# Patient Record
Sex: MALE | Race: WHITE | Employment: OTHER | URBAN - METROPOLITAN AREA
[De-identification: names, ages, dates, MRNs, and addresses within clinical notes are randomized per-mention and may not be internally consistent; named-entity substitution may affect disease eponyms.]

---

## 2017-01-06 PROBLEM — I35.0 NONRHEUMATIC AORTIC VALVE STENOSIS: Status: ACTIVE | Noted: 2017-01-06

## 2017-02-11 ENCOUNTER — HOSPITAL ENCOUNTER (INPATIENT)
Age: 82
LOS: 1 days | Discharge: HOME | DRG: 312 | End: 2017-02-13
Attending: EMERGENCY MEDICINE | Admitting: INTERNAL MEDICINE
Payer: MEDICARE

## 2017-02-11 DIAGNOSIS — N17.9 ACUTE RENAL FAILURE, UNSPECIFIED ACUTE RENAL FAILURE TYPE (HCC): ICD-10-CM

## 2017-02-11 DIAGNOSIS — I35.0 AORTIC VALVE STENOSIS, UNSPECIFIED ETIOLOGY: ICD-10-CM

## 2017-02-11 DIAGNOSIS — R55 SYNCOPE AND COLLAPSE: Primary | ICD-10-CM

## 2017-02-11 PROCEDURE — 99285 EMERGENCY DEPT VISIT HI MDM: CPT | Performed by: EMERGENCY MEDICINE

## 2017-02-11 NOTE — IP AVS SNAPSHOT
Current Discharge Medication List  
  
Take these medications at their scheduled times Dose & Instructions Dispensing Information Comments Morning Noon Evening Bedtime  
 finasteride 5 mg tablet Commonly known as:  PROSCAR Your next dose is: Today, Tomorrow Other:  ____________ Dose:  5 mg Take 5 mg by mouth daily. Refills:  0  
     
   
   
   
  
 simvastatin 20 mg tablet Commonly known as:  ZOCOR Your next dose is: Today, Tomorrow Other:  ____________ Dose:  20 mg Take 1 Tab by mouth nightly. Quantity:  90 Tab Refills:  1  
     
   
   
   
  
 sodium bicarbonate 650 mg tablet Your next dose is: Today, Tomorrow Other:  ____________ Dose:  650 mg Take 650 mg by mouth two (2) times a day. 6 tabs per day Refills:  0  
     
   
   
   
  
 terazosin 10 mg capsule Commonly known as:  HYTRIN Your next dose is: Today, Tomorrow Other:  ____________ Dose:  10 mg Take 1 Cap by mouth nightly. Quantity:  90 Cap Refills:  4 Take these medications as needed Dose & Instructions Dispensing Information Comments Morning Noon Evening Bedtime  
 hydrocortisone 25 mg Supp Commonly known as:  ANUSOL-HC Your next dose is: Today, Tomorrow Other:  ____________ Dose:  25 mg Insert 1 Suppository into rectum two (2) times daily as needed for Hemorrhoids. Quantity:  12 Suppository Refills:  2 Take these medications as directed Dose & Instructions Dispensing Information Comments Morning Noon Evening Bedtime  
 calcitRIOL 0.25 mcg capsule Commonly known as:  ROCALTROL Your next dose is: Today, Tomorrow Other:  ____________   Refills:  11

## 2017-02-11 NOTE — IP AVS SNAPSHOT
[address] 
[phone] Patient: Cornelio Miller. Genevieve Kiran MRN: [id number] [facility]:[birth year] You are allergic to the following Allergen Reactions Other Medication Unknown (comments) Lexi Carias Recent Documentation Height Weight BMI Smoking Status 1.778 m 95.7 kg 30.28 kg/m2 Former Smoker Emergency Contacts Name Discharge Info Relation Home Work Mobile Irasema Lombardo  Spouse [3] [phone] About your hospitalization You were admitted on:  February 12, 2017 You last received care in the:  [facility] 3 ICU You were discharged on:  February 13, 2017 Unit phone number:  [phone] Why you were hospitalized Your primary diagnosis was:  Syncope Your diagnoses also included:  Anemia In Chronic Kidney Disease, Chronic Kidney Disease, Stage Iv (Severe) (Hcc), Nonrheumatic Aortic Valve Stenosis, George (Acute Kidney Injury) (Hcc), Thrombocytopenia (Hcc), Orthostatic Hypotension Providers Seen During Your Hospitalizations Provider Role Specialty Primary office phone Ben Rivera MD Attending Provider Emergency Medicine [phone] Scott Garcia DO Attending Provider Internal Medicine [phone] Mick Amado MD Attending Provider Internal Medicine [phone] Your Primary Care Physician (PCP) Primary Care Physician Office Phone Office Fax Hector Schrader [phone] Follow-up Information Follow up With Details Comments [address] Cardiology Schedule an appointment as soon as possible for a visit in 3 days to establish care for severe Aortic Stenosis in patient with multiple syncopal episodes over past month Regi Shen MD Schedule an appointment as soon as possible for a visit in 1 week  [address] 
[phone] Marifer Donahue MD   [address] [facility] [address]  [address] 
[phone] Marifer Donahue MD   [address] [facility] [address]  [address] 
[phone] Current Discharge Medication List  
  
CONTINUE these medications which have NOT CHANGED Dose & Instructions Dispensing Information Comments Morning Noon Evening Bedtime  
 calcitRIOL 0.25 mcg capsule Commonly known as:  ROCALTROL Your next dose is: Today, Tomorrow Other:  _________ Refills:  11  
     
   
   
   
  
 finasteride 5 mg tablet Commonly known as:  PROSCAR Your next dose is: Today, Tomorrow Other:  _________ Dose:  5 mg Take 5 mg by mouth daily. Refills:  0  
     
   
   
   
  
 hydrocortisone 25 mg Supp Commonly known as:  ANUSOL-HC Your next dose is: Today, Tomorrow Other:  _________ Dose:  25 mg Insert 1 Suppository into rectum two (2) times daily as needed for Hemorrhoids. Quantity:  12 Suppository Refills:  2  
     
   
   
   
  
 simvastatin 20 mg tablet Commonly known as:  ZOCOR Your next dose is: Today, Tomorrow Other:  _________ Dose:  20 mg Take 1 Tab by mouth nightly. Quantity:  90 Tab Refills:  1  
     
   
   
   
  
 sodium bicarbonate 650 mg tablet Your next dose is: Today, Tomorrow Other:  _________ Dose:  650 mg Take 650 mg by mouth two (2) times a day. 6 tabs per day Refills:  0  
     
   
   
   
  
 terazosin 10 mg capsule Commonly known as:  HYTRIN Your next dose is: Today, Tomorrow Other:  _________ Dose:  10 mg Take 1 Cap by mouth nightly. Quantity:  90 Cap Refills:  4 Discharge Instructions DISCHARGE SUMMARY from Nurse The following personal items are in your possession at time of discharge: 
 
Dental Appliances: None Visual Aid: Glasses, With patient Home Medications: None Jewelry: None Clothing: None Other Valuables: Other (comment) ([place]) PATIENT INSTRUCTIONS: 
 
 
F-face looks uneven A-arms unable to move or move unevenly S-speech slurred or non-existent T-time-call 911 as soon as signs and symptoms begin-DO NOT go Back to bed or wait to see if you get better-TIME IS BRAIN. Warning Signs of HEART ATTACK Call 911 if you have these symptoms: 
? Chest discomfort. Most heart attacks involve discomfort in the center of the chest that lasts more than a few minutes, or that goes away and comes back. It can feel like uncomfortable pressure, squeezing, fullness, or pain. ? Discomfort in other areas of the upper body. Symptoms can include pain or discomfort in one or both arms, the back, neck, jaw, or stomach. ? Shortness of breath with or without chest discomfort. ? Other signs may include breaking out in a cold sweat, nausea, or lightheadedness. Don't wait more than five minutes to call [address]! Fast action can save your life. Calling 911 is almost always the fastest way to get lifesaving treatment. Emergency Medical Services staff can begin treatment when they arrive  up to an hour sooner than if someone gets to the hospital by car. The discharge information has been reviewed with the {PATIENT PARENT GUARDIAN:38409}. The {PATIENT PARENT GUARDIAN:31787} verbalized understanding. Discharge medications reviewed with the {Dishcarge meds reviewed ITIT:44169} and appropriate educational materials and side effects teaching were provided. Discharge Instructions Attachments/References FAINTING (ENGLISH) Discharge Orders None ACO Transitions of Care Introducing [employer] [address] offers a voluntary care coordination program to provide high quality service and care to [facility] fee-for-service beneficiaries. Junaid Aguilar was designed to help you enhance your health and well-being through the following services: ? Transitions of Care  support for individuals who are transitioning from one care setting to another (example: Hospital to home). ? Chronic and Complex Care Coordination  support for individuals and caregivers of those with serious or chronic illnesses or with more than one chronic (ongoing) condition and those who take a number of different medications. If you meet specific medical criteria, a [address] [place] [address] Rd may call you directly to coordinate your care with your primary care physician and your other care providers. For questions about the [facility] programs, please, contact your physicians office. For general questions or additional information about Accountable Care Organizations: 
Please visit www.medicare.gov/acos. html or call 1-800-MEDICARE (3-524.474.5325) TTY users should call 0-164.456.1946. [employer] Announcement We are excited to announce that we are making your provider's discharge notes available to you in [employer]. You will see these notes when they are completed and signed by the physician that discharged you from your recent hospital stay. If you have any questions or concerns about any information you see in [employer], please call the Health Information Department where you were seen or reach out to your Primary Care Provider for more information about your plan of care. Introducing [facility]! Kamari Dhillon introduces [employer] patient portal. Now you can access parts of your medical record, email your doctor's office, and request medication refills online.    
 
1. In your internet browser, go to [email or web address] 2. Click on the First Time User? Click Here link in the Sign In box. You will see the New Member Sign Up page. 3. Enter your [employer] Access Code exactly as it appears below. You will not need to use this code after youve completed the sign-up process. If you do not sign up before the expiration date, you must request a new code. · [employer] Access Code: [email or web address] Expires: 2/14/2017  4:44 PM 
 
4. Enter the last four digits of your Social Security Number (xxxx) and Date of Birth (mm/dd/yyyy) as indicated and click Submit. You will be taken to the next sign-up page. 5. Create a CipherCloud ID. This will be your [employer]Cloud login ID and cannot be changed, so think of one that is secure and easy to remember. 6. Create a [employer] password. You can change your password at any time. 7. Enter your Password Reset Question and Answer. This can be used at a later time if you forget your password. 8. Enter your e-mail address. You will receive e-mail notification when new information is available in [address] 9. Click Sign Up. You can now view and download portions of your medical record. 10. Click the Download Summary menu link to download a portable copy of your medical information. If you have questions, please visit the Frequently Asked Questions section of the [employer] website. Remember, [employer] is NOT to be used for urgent needs. For medical emergencies, dial 911. Now available from your iPhone and Android! General Information Please provide this summary of care documentation to your next provider. Patient Signature:  ____________________________________________________________ Date:  ____________________________________________________________  
  
Paulene Greener Provider Signature:  ____________________________________________________________ Date:  ____________________________________________________________ More Information Fainting: Care Instructions Your Care Instructions When you faint, or pass out, you lose consciousness for a short time. A brief drop in blood flow to the brain often causes it. When you fall or lie down, more blood flows to your brain and you regain consciousness. Emotional stress, pain, or overheatingespecially if you have been standingcan make you faint. In these cases, fainting is usually not serious. But fainting can be a sign of a more serious problem. Your doctor may want you to have more tests to rule out other causes. The treatment you need depends on the reason why you fainted. The doctor has checked you carefully, but problems can develop later. If you notice any problems or new symptoms, get medical treatment right away. Follow-up care is a key part of your treatment and safety. Be sure to make and go to all appointments, and call your doctor if you are having problems. It's also a good idea to know your test results and keep a list of the medicines you take. How can you care for yourself at home? · Drink plenty of fluids to prevent dehydration. If you have kidney, heart, or liver disease and have to limit fluids, talk with your doctor before you increase your fluid intake. When should you call for help? Call 911 anytime you think you may need emergency care. For example, call if: 
· You have symptoms of a heart problem. These may include: ¨ Chest pain or pressure. ¨ Severe trouble breathing. ¨ A fast or irregular heartbeat. ¨ Lightheadedness or sudden weakness. ¨ Coughing up pink, foamy mucus. ¨ Passing out. After you call 911, the  may tell you to chew 1 adult-strength or 2 to 4 low-dose aspirin. Wait for an ambulance. Do not try to drive yourself. · You have symptoms of a stroke. These may include: 
¨ Sudden numbness, tingling, weakness, or loss of movement in your face, arm, or leg, especially on only one side of your body. ¨ Sudden vision changes. ¨ Sudden trouble speaking. ¨ Sudden confusion or trouble understanding simple statements. ¨ Sudden problems with walking or balance. ¨ A sudden, severe headache that is different from past headaches. · You passed out (lost consciousness) again. Watch closely for changes in your health, and be sure to contact your doctor if: 
· You do not get better as expected. Where can you learn more? Go to http://valdo-gillian.info/. Enter F544 in the search box to learn more about \"Fainting: Care Instructions. \" Current as of: May 27, 2016 Content Version: 11.1 © 5355-7617 [employer]. Care instructions adapted under license by [employer] (which disclaims liability or warranty for this information). If you have questions about a medical condition or this instruction, always ask your healthcare professional. [address] any warranty or liability for your use of this information.

## 2017-02-12 ENCOUNTER — APPOINTMENT (OUTPATIENT)
Dept: GENERAL RADIOLOGY | Age: 82
DRG: 312 | End: 2017-02-12
Attending: EMERGENCY MEDICINE
Payer: MEDICARE

## 2017-02-12 PROBLEM — R55 SYNCOPE: Status: ACTIVE | Noted: 2017-02-12

## 2017-02-12 PROBLEM — N17.9 AKI (ACUTE KIDNEY INJURY) (HCC): Status: ACTIVE | Noted: 2017-02-12

## 2017-02-12 LAB
ALBUMIN SERPL BCP-MCNC: 3.2 G/DL (ref 3.2–4.6)
ALBUMIN/GLOB SERPL: 1.1 {RATIO} (ref 1.2–3.5)
ALP SERPL-CCNC: 113 U/L (ref 50–136)
ALT SERPL-CCNC: 35 U/L (ref 12–65)
ANION GAP BLD CALC-SCNC: 12 MMOL/L (ref 7–16)
AST SERPL W P-5'-P-CCNC: 28 U/L (ref 15–37)
ATRIAL RATE: 73 BPM
BASOPHILS # BLD AUTO: 0.2 K/UL (ref 0–0.2)
BASOPHILS # BLD: 3 % (ref 0–2)
BILIRUB SERPL-MCNC: 0.2 MG/DL (ref 0.2–1.1)
BNP SERPL-MCNC: <2 PG/ML
BUN SERPL-MCNC: 56 MG/DL (ref 8–23)
CALCIUM SERPL-MCNC: 7.3 MG/DL (ref 8.3–10.4)
CALCULATED P AXIS, ECG09: 60 DEGREES
CALCULATED R AXIS, ECG10: -3 DEGREES
CALCULATED T AXIS, ECG11: 72 DEGREES
CHLORIDE SERPL-SCNC: 108 MMOL/L (ref 98–107)
CO2 SERPL-SCNC: 23 MMOL/L (ref 21–32)
CREAT SERPL-MCNC: 5.08 MG/DL (ref 0.8–1.5)
DIAGNOSIS, 93000: NORMAL
DIASTOLIC BP, ECG02: NORMAL MMHG
DIFFERENTIAL METHOD BLD: ABNORMAL
EOSINOPHIL # BLD: 0.1 K/UL (ref 0–0.8)
EOSINOPHIL NFR BLD: 2 % (ref 0.5–7.8)
ERYTHROCYTE [DISTWIDTH] IN BLOOD BY AUTOMATED COUNT: 16.5 % (ref 11.9–14.6)
GLOBULIN SER CALC-MCNC: 2.9 G/DL (ref 2.3–3.5)
GLUCOSE SERPL-MCNC: 172 MG/DL (ref 65–100)
HCT VFR BLD AUTO: 25.5 % (ref 41.1–50.3)
HGB BLD-MCNC: 7.9 G/DL (ref 13.6–17.2)
IMM GRANULOCYTES # BLD: 0.1 K/UL (ref 0–0.5)
IMM GRANULOCYTES NFR BLD AUTO: 1.5 % (ref 0–5)
LYMPHOCYTES # BLD AUTO: 7 % (ref 13–44)
LYMPHOCYTES # BLD: 0.4 K/UL (ref 0.5–4.6)
MCH RBC QN AUTO: 28.5 PG (ref 26.1–32.9)
MCHC RBC AUTO-ENTMCNC: 31 G/DL (ref 31.4–35)
MCV RBC AUTO: 92.1 FL (ref 79.6–97.8)
MONOCYTES # BLD: 0.4 K/UL (ref 0.1–1.3)
MONOCYTES NFR BLD AUTO: 8 % (ref 4–12)
NEUTS SEG # BLD: 4.2 K/UL (ref 1.7–8.2)
NEUTS SEG NFR BLD AUTO: 79 % (ref 43–78)
P-R INTERVAL, ECG05: 194 MS
PLATELET # BLD AUTO: 80 K/UL (ref 150–450)
PMV BLD AUTO: 12.8 FL (ref 10.8–14.1)
POTASSIUM SERPL-SCNC: 3.7 MMOL/L (ref 3.5–5.1)
PROT SERPL-MCNC: 6.1 G/DL (ref 6.3–8.2)
Q-T INTERVAL, ECG07: 428 MS
QRS DURATION, ECG06: 90 MS
QTC CALCULATION (BEZET), ECG08: 471 MS
RBC # BLD AUTO: 2.77 M/UL (ref 4.23–5.67)
SODIUM SERPL-SCNC: 143 MMOL/L (ref 136–145)
SYSTOLIC BP, ECG01: NORMAL MMHG
TROPONIN I BLD-MCNC: 0.06 NG/ML (ref 0–0.08)
TROPONIN I BLD-MCNC: 0.07 NG/ML (ref 0–0.08)
TROPONIN I SERPL-MCNC: 0.09 NG/ML (ref 0.02–0.05)
VENTRICULAR RATE, ECG03: 73 BPM
WBC # BLD AUTO: 5.3 K/UL (ref 4.3–11.1)

## 2017-02-12 PROCEDURE — 65660000000 HC RM CCU STEPDOWN

## 2017-02-12 PROCEDURE — 84484 ASSAY OF TROPONIN QUANT: CPT

## 2017-02-12 PROCEDURE — 80053 COMPREHEN METABOLIC PANEL: CPT | Performed by: EMERGENCY MEDICINE

## 2017-02-12 PROCEDURE — 71020 XR CHEST PA LAT: CPT

## 2017-02-12 PROCEDURE — 85025 COMPLETE CBC W/AUTO DIFF WBC: CPT | Performed by: EMERGENCY MEDICINE

## 2017-02-12 PROCEDURE — 83880 ASSAY OF NATRIURETIC PEPTIDE: CPT | Performed by: EMERGENCY MEDICINE

## 2017-02-12 PROCEDURE — 93005 ELECTROCARDIOGRAM TRACING: CPT | Performed by: EMERGENCY MEDICINE

## 2017-02-12 PROCEDURE — 74011250636 HC RX REV CODE- 250/636: Performed by: INTERNAL MEDICINE

## 2017-02-12 RX ORDER — SODIUM CHLORIDE 0.9 % (FLUSH) 0.9 %
5-10 SYRINGE (ML) INJECTION EVERY 8 HOURS
Status: DISCONTINUED | OUTPATIENT
Start: 2017-02-12 | End: 2017-02-13 | Stop reason: HOSPADM

## 2017-02-12 RX ORDER — HEPARIN SODIUM 5000 [USP'U]/ML
5000 INJECTION, SOLUTION INTRAVENOUS; SUBCUTANEOUS EVERY 12 HOURS
Status: DISCONTINUED | OUTPATIENT
Start: 2017-02-12 | End: 2017-02-13 | Stop reason: HOSPADM

## 2017-02-12 RX ORDER — FINASTERIDE 5 MG/1
5 TABLET, FILM COATED ORAL DAILY
COMMUNITY
End: 2017-09-26 | Stop reason: SDUPTHER

## 2017-02-12 RX ORDER — SODIUM CHLORIDE 9 MG/ML
100 INJECTION, SOLUTION INTRAVENOUS CONTINUOUS
Status: ACTIVE | OUTPATIENT
Start: 2017-02-12 | End: 2017-02-12

## 2017-02-12 RX ORDER — ACETAMINOPHEN 325 MG/1
650 TABLET ORAL
Status: DISCONTINUED | OUTPATIENT
Start: 2017-02-12 | End: 2017-02-13 | Stop reason: HOSPADM

## 2017-02-12 RX ORDER — HEPARIN SODIUM 5000 [USP'U]/ML
5000 INJECTION, SOLUTION INTRAVENOUS; SUBCUTANEOUS EVERY 12 HOURS
Status: DISCONTINUED | OUTPATIENT
Start: 2017-02-12 | End: 2017-02-12 | Stop reason: SDUPTHER

## 2017-02-12 RX ORDER — SODIUM CHLORIDE 0.9 % (FLUSH) 0.9 %
5-10 SYRINGE (ML) INJECTION AS NEEDED
Status: DISCONTINUED | OUTPATIENT
Start: 2017-02-12 | End: 2017-02-13 | Stop reason: HOSPADM

## 2017-02-12 RX ORDER — ONDANSETRON 2 MG/ML
4 INJECTION INTRAMUSCULAR; INTRAVENOUS
Status: DISCONTINUED | OUTPATIENT
Start: 2017-02-12 | End: 2017-02-13 | Stop reason: HOSPADM

## 2017-02-12 RX ORDER — SODIUM BICARBONATE 650 MG/1
650 TABLET ORAL 2 TIMES DAILY
Status: CANCELLED | OUTPATIENT
Start: 2017-02-12

## 2017-02-12 RX ORDER — CALCITRIOL 0.25 UG/1
0.25 CAPSULE ORAL DAILY
Status: CANCELLED | OUTPATIENT
Start: 2017-02-13

## 2017-02-12 RX ADMIN — Medication 10 ML: at 22:00

## 2017-02-12 RX ADMIN — SODIUM CHLORIDE 100 ML/HR: 900 INJECTION, SOLUTION INTRAVENOUS at 03:00

## 2017-02-12 RX ADMIN — HEPARIN SODIUM 5000 UNITS: 5000 INJECTION, SOLUTION INTRAVENOUS; SUBCUTANEOUS at 20:58

## 2017-02-12 NOTE — ED TRIAGE NOTES
Pt states that he stood up too fast and \"passed out\". Awake, alert and oriented at the present time. Continues to c/o some weakness. Received 250 ml's of NS in route to the ED.

## 2017-02-12 NOTE — ED NOTES
Asked to evaluate skin tears on his arm. There are two skin tears on the right elbow. I cleaned them with skin  and applied Dermabond to each wound. No complications. Tolerated well. There is no bony tenderness or deformity to the arm.

## 2017-02-12 NOTE — ED NOTES
TRANSFER - OUT REPORT:    Verbal report given to Lane Sanford (name) on Ollie Turpin  being transferred to icu (unit) for routine progression of care       Report consisted of patients Situation, Background, Assessment and   Recommendations(SBAR). Information from the following report(s) SBAR, ED Summary, [facility] and Recent Results was reviewed with the receiving nurse. Lines:   Peripheral IV 02/12/17 Left Forearm (Active)   Site Assessment Clean, dry, & intact 2/12/2017  1:23 PM   Phlebitis Assessment 0 2/12/2017  1:23 PM   Infiltration Assessment 0 2/12/2017  1:23 PM   Dressing Status Clean, dry, & intact 2/12/2017  1:23 PM        Opportunity for questions and clarification was provided.       Patient transported with:

## 2017-02-12 NOTE — PROGRESS NOTES
Dual skin assessment completed with Tim Pollock RN. Skin tears on R elbow x2. No other skin breakdown noted.

## 2017-02-12 NOTE — CONSULTS
[facility] CONSULT NOTE    We were asked to see the patient at the request of Dr. Alfreda Martinez for evaluation of CKD 5. Admission Date:  2/11/2017    Admission Diagnosis:  Syncope    History of Present Illness:    Patient is an 80year old male patient with stage 5 CKD (Cr= 4.3 on last labs, but baseline anywhere from 4.3-4.9) and aortic valve stenosis admitted for syncopal episode. The patient was in his recliner and shortly after standing up, he passed out and was unresponsive for about 10 minutes according to the wife. He has been admitted for further evaluation and we are consulted for CKD. He denies chest pain, trouble breathing, edema, changes in urinary frequency, hematuria, NSAID use, or other complaints.      Past Medical History   Diagnosis Date    Anemia in chronic kidney disease 7/13/2015    Arthritis     Chest pain on exertion 11/16/2016    Chronic kidney disease, stage IV (severe) (HCC) 7/13/2015    High cholesterol     Hypertrophy of prostate with urinary obstruction and other lower urinary tract symptoms (LUTS) 7/13/2015     Benign    Mixed hyperlipidemia 7/13/2015    Obesity (BMI 30-39.9) 11/18/2015      Past Surgical History   Procedure Laterality Date    Pr appendectomy      Hx knee arthroscopy      Hx colonoscopy  2010     Normal      Current Facility-Administered Medications   Medication Dose Route Frequency    sodium chloride (NS) flush 5-10 mL  5-10 mL IntraVENous Q8H    sodium chloride (NS) flush 5-10 mL  5-10 mL IntraVENous PRN    acetaminophen (TYLENOL) tablet 650 mg  650 mg Oral Q6H PRN    ondansetron (ZOFRAN) injection 4 mg  4 mg IntraVENous Q6H PRN    heparin (porcine) injection 5,000 Units  5,000 Units SubCUTAneous Q12H     Allergies   Allergen Reactions    Other Medication Unknown (comments)     DURATUSS      Social History   Substance Use Topics    Smoking status: Former Smoker    Smokeless tobacco: Never Used    Alcohol use No      Family History Problem Relation Age of Onset    Cancer Father      Colon    Hypertension Mother         Review of Systems:  ROS as outlined in HPI. No CP or SOB. Objective:  Vitals:    02/12/17 0645 02/12/17 0700 02/12/17 1300 02/12/17 1320   BP: 123/60 127/60 127/60 125/59   Pulse: 67 70  77   Resp: 16 18  21   Temp:    98 °F (36.7 °C)   SpO2: 94% (!) 89% 95% 94%   Weight:       Height:           Intake/Output Summary (Last 24 hours) at 02/12/17 1723  Last data filed at 02/12/17 1450   Gross per 24 hour   Intake                0 ml   Output              675 ml   Net             -675 ml     Wt Readings from Last 3 Encounters:   02/11/17 95.7 kg (211 lb)   01/06/17 97.5 kg (215 lb)   12/14/16 96.9 kg (213 lb 9.6 oz)       GEN - in no distress, alert and oriented  Neck - no JVD  CV - S1, S2, no rub. +murmur. Lung - clear bilaterally, lungs expand symmetrically  Chest wall - normal appearance  Abd - soft, nontender  Ext - no edema  Neurologic - nonfocal  Genitourinary - bladder nonpalpable      Data Review:     Recent Labs      02/12/17   0048   WBC  5.3   HGB  7.9*   HCT  25.5*   PLT  80*        Recent Labs      02/12/17   0048   NA  143   K  3.7   CL  108*   CO2  23   BUN  56*   CREA  5.08*   CA  7.3*   GLU  172*         Assessment/Plan:     Principal Problem:    Syncope (2/12/2017)    Active Problems:    Anemia in chronic kidney disease (7/13/2015)      Chronic kidney disease, stage IV (severe) (HCC) (7/13/2015)      Nonrheumatic aortic valve stenosis (1/6/2017)      JUSTIN (acute kidney injury) ([facility] [address] (2/12/2017)    -- CKD 5: Patient is near baseline Cr. The patient absolutely refuses dialysis and at this time no indication for HD. He is well established with Dr Rachael Song as an outpatient and would prefer to follow up with him as an outpatient. Please call us should any needs arise. Next appointment with Dr Rachael Song on March 31, but will arrange hospital follow up in the next few weeks.    -- Aortic valve stenosis  -- Anemia    Autumn Tobias, NP-c

## 2017-02-12 NOTE — ED PROVIDER NOTES
HPI Comments: Patient is an 71-year-old male with past medical history of severe aortic stenosis, chronic kidney disease, anemia who presents with syncope. He reports he stood up and was ambulating for a little bit and became quite lightheaded. The last thing he remembers is he was surrounded by EMS and family members. Family states they heard a sound and found him unconscious. They estimate he was unconscious for approximately 10 minutes. He was not able to speak during this time. Prior to the event he denied any significant chest pain, shortness of breath, abdominal pain or headache. States he did feel quite lightheaded however. Patient is a 80 y.o. male presenting with syncope. The history is provided by the patient. No  was used. Syncope    This is a new problem. The current episode started less than 1 hour ago. The problem occurs constantly. The problem has been gradually improving. He lost consciousness for a period of greater than 5 minutes. The problem is associated with standing up. Pertinent negatives include no chest pain, no palpitations, no fever, no abdominal pain, no headaches, no back pain, no slurred speech and no head injury. His past medical history is significant for syncope.         Past Medical History:   Diagnosis Date    Anemia in chronic kidney disease 7/13/2015    Arthritis     Chest pain on exertion 11/16/2016    Chronic kidney disease, stage IV (severe) (HCC) 7/13/2015    High cholesterol     Hypertrophy of prostate with urinary obstruction and other lower urinary tract symptoms (LUTS) 7/13/2015     Benign    Mixed hyperlipidemia 7/13/2015    Obesity (BMI 30-39.9) 11/18/2015       Past Surgical History:   Procedure Laterality Date    Pr appendectomy      Hx knee arthroscopy      Hx colonoscopy  2010     Normal         Family History:   Problem Relation Age of Onset    Cancer Father      Colon    Hypertension Mother        Social History Social History    Marital status:      Spouse name: N/A    Number of children: N/A    Years of education: N/A     Occupational History    Not on file. Social History Main Topics    Smoking status: Former Smoker    Smokeless tobacco: Never Used    Alcohol use No    Drug use: No    Sexual activity: Yes     Partners: Female     Other Topics Concern    Not on file     Social History Narrative         ALLERGIES: Other medication    Review of Systems   Constitutional: Negative for fatigue and fever. HENT: Negative for sore throat. Eyes: Negative for pain. Respiratory: Negative for cough, chest tightness and shortness of breath. Cardiovascular: Positive for syncope. Negative for chest pain, palpitations and leg swelling. Gastrointestinal: Negative for abdominal pain. Genitourinary: Negative for dysuria. Musculoskeletal: Negative for back pain. Neurological: Positive for syncope. Negative for headaches. Vitals:    02/11/17 2356   BP: 105/55   Pulse: 74   Resp: 16   Temp: 98 °F (36.7 °C)   SpO2: 96%   Weight: 95.7 kg (211 lb)   Height: 5' 10\" (1.778 m)            Physical Exam   Constitutional: He is oriented to person, place, and time. He appears well-developed and well-nourished. No distress. HENT:   Head: Normocephalic and atraumatic. Eyes: Conjunctivae and EOM are normal. Pupils are equal, round, and reactive to light. Neck: Normal range of motion. Neck supple. Cardiovascular: Normal rate and regular rhythm. Murmur heard. Harsh systolic murmur consistent with aortic stenosis   Pulmonary/Chest: Effort normal and breath sounds normal. No respiratory distress. He has no wheezes. He has no rales. Abdominal: Soft. He exhibits no distension. There is no tenderness. There is no rebound. Musculoskeletal: Normal range of motion. He exhibits no edema or tenderness. Neurological: He is alert and oriented to person, place, and time. Skin: Skin is warm and dry.  No rash noted. He is not diaphoretic. Psychiatric: He has a normal mood and affect. His behavior is normal.   Vitals reviewed. MDM  Number of Diagnoses or Management Options  Acute renal failure, unspecified acute renal failure type [facility]): new and requires workup  Aortic valve stenosis, unspecified etiology: new and requires workup  Syncope and collapse: new and requires workup  Diagnosis management comments: Patient resting comfortably and reports no pain. Patient will be admitted to the hospitalist service for syncope in the setting of severe aortic stenosis, acute renal failure and anemia. Family updated in agreement.        Amount and/or Complexity of Data Reviewed  Clinical lab tests: ordered and reviewed (Results for orders placed or performed during the hospital encounter of 02/11/17  -CBC WITH AUTOMATED DIFF       Result                                            Value                         Ref Range                       WBC                                               5.3                           4.3 - 11.1 K/uL                 RBC                                               2.77 (L)                      4.23 - 5.67 M/uL                HGB                                               7.9 (L)                       13.6 - 17.2 g/dL                HCT                                               25.5 (L)                      41.1 - 50.3 %                   MCV                                               92.1                          79.6 - 97.8 FL                  MCH                                               28.5                          26.1 - 32.9 PG                  MCHC                                              31.0 (L)                      31.4 - 35.0 g/dL                RDW                                               16.5 (H)                      11.9 - 14.6 %                   PLATELET                                          80 (L)                        150 - 450 K/uL MPV                                               12.8                          10.8 - 14.1 FL                  DF                                                AUTOMATED                                                     NEUTROPHILS                                       79 (H)                        43 - 78 %                       LYMPHOCYTES                                       7 (L)                         13 - 44 %                       MONOCYTES                                         8                             4.0 - 12.0 %                    EOSINOPHILS                                       2                             0.5 - 7.8 %                     BASOPHILS                                         3 (H)                         0.0 - 2.0 %                     IMMATURE GRANULOCYTES                             1.5                           0.0 - 5.0 %                     ABS. NEUTROPHILS                                  4.2                           1.7 - 8.2 K/UL                  ABS. LYMPHOCYTES                                  0.4 (L)                       0.5 - 4.6 K/UL                  ABS. MONOCYTES                                    0.4                           0.1 - 1.3 K/UL                  ABS. EOSINOPHILS                                  0.1                           0.0 - 0.8 K/UL                  ABS. BASOPHILS                                    0.2                           0.0 - 0.2 K/UL                  ABS. IMM.  GRANS.                                  0.1                           0.0 - 0.5 K/UL             -METABOLIC PANEL, COMPREHENSIVE       Result                                            Value                         Ref Range                       Sodium                                            143                           136 - 145 mmol/L                Potassium                                         3.7                           3.5 - 5.1 mmol/L Chloride                                          108 (H)                       98 - 107 mmol/L                 CO2                                               23                            21 - 32 mmol/L                  Anion gap                                         12                            7 - 16 mmol/L                   Glucose                                           172 (H)                       65 - 100 mg/dL                  BUN                                               56 (H)                        8 - 23 MG/DL                    Creatinine                                        5.08 (H)                      0.8 - 1.5 MG/DL                 GFR est AA                                        14 (L)                        >60 ml/min/1.73m2               GFR est non-AA                                    12 (L)                        >60 ml/min/1.73m2               Calcium                                           7.3 (L)                       8.3 - 10.4 MG/DL                Bilirubin, total                                  0.2                           0.2 - 1.1 MG/DL                 ALT (SGPT)                                        35                            12 - 65 U/L                     AST (SGOT)                                        28                            15 - 37 U/L                     Alk. phosphatase                                  113                           50 - 136 U/L                    Protein, total                                    6.1 (L)                       6.3 - 8.2 g/dL                  Albumin                                           3.2                           3.2 - 4.6 g/dL                  Globulin                                          2.9                           2.3 - 3.5 g/dL                  A-G Ratio                                         1.1 (L)                       1.2 - 3.5                  -BNP       Result Value                         Ref Range                       BNP                                               <2                            pg/mL                      -POC TROPONIN-I       Result                                            Value                         Ref Range                       Troponin-I (POC)                                  0.06                          0.0 - 0.08 ng/ml           -POC TROPONIN-I       Result                                            Value                         Ref Range                       Troponin-I (POC)                                  0.07                          0.0 - 0.08 ng/ml           )  Tests in the radiology section of CPT®: ordered and reviewed (Xr Chest Pa Lat    Result Date: 2/12/2017  EXAM:  XR CHEST PA LAT INDICATION:   syncope COMPARISON: None. FINDINGS: PA and lateral radiographs of the chest demonstrate clear lungs. The cardiac and mediastinal contours and pulmonary vascularity are normal.  The bones and soft tissues are within normal limits.      IMPRESSION: Normal chest.     )  Review and summarize past medical records: yes  Discuss the patient with other providers: yes  Independent visualization of images, tracings, or specimens: yes    Risk of Complications, Morbidity, and/or Mortality  Presenting problems: high  Diagnostic procedures: high  Management options: high    Patient Progress  Patient progress: stable    ED Course       Procedures

## 2017-02-12 NOTE — PROGRESS NOTES
Progress Note    Patient: Rossy Gardner MRN: [id number]  SSN: [id number]    YOB: 1935  Age: 80 y.o. Sex: male      Admit Date: 2/11/2017    LOS: 0 days     Subjective:     \" I feel fine when I lie flat. If I increase my activity I'm SOB\" \" I did my activity too quickly last night and passed out. \"    Objective:     Vitals:    02/12/17 0615 02/12/17 0630 02/12/17 0645 02/12/17 0700   BP: 124/56 134/63 123/60 127/60   Pulse: 68 66 67 70   Resp: 17 18 16 18   Temp:       SpO2: 94% 93% 94% (!) 89%   Weight:       Height:            Intake and Output:  Current Shift:    Last three shifts:      Physical Exam:   General:  Alert, cooperative, no distress, appears stated age. Eyes:  Conjunctivae/corneas clear. PERRL, EOMs intact. Fundi benign   Ears:  Normal TMs and external ear canals both ears. Nose: Nares normal. Septum midline. Mucosa normal. No drainage or sinus tenderness. Mouth/Throat: Lips, mucosa, and tongue normal. Teeth and gums normal.   Neck: Supple, symmetrical, trachea midline, no adenopathy, thyroid: no enlargment/tenderness/nodules, radiated carotid bruit and no JVD. Back:   Symmetric, no curvature. ROM normal. No CVA tenderness. Lungs:   Clear to auscultation bilaterally. Heart:  Regular rate and rhythm, S1, S2 normal, 2/6 KRISTINA Aortic focus, click, rub or gallop. Abdomen:   Soft, non-tender. Bowel sounds normal. No masses,  No organomegaly. Extremities: Extremities normal, atraumatic, no cyanosis or edema. Pulses: 2+ and symmetric all extremities. Skin: Skin color, texture, turgor normal. No rashes or lesions   Lymph nodes: Cervical, supraclavicular, and axillary nodes normal.   Neurologic: CNII-XII intact. Normal strength, sensation and reflexes throughout. Lab/Data Review: All lab results for the last 24 hours reviewed.                Assessment/ Plan:     Principal Problem:    Syncope (2/12/2017)    Active Problems:    Anemia in chronic kidney disease (7/13/2015)      Chronic kidney disease, stage IV (severe) ([facility] [address] (7/13/2015)      Nonrheumatic aortic valve stenosis (1/6/2017)      JUSTIN (acute kidney injury) ([facility] (2/12/2017)      1. Syncope:  Admission during the night to telemetry. Monitor orthostatic blood pressures. Valvular heart disease may play a factor. Monitor. Workup in progress. Echo 12/22/16. Defer to cards. 2. .5 long discussion with patient and he adamantly states he does not want to be on dialysis. He follows with Dr. Antoine Palma outpt as his nephrologist. He does not have an access in place. 3. AVS: evaluated by a cardiologist recently who felt he should have his valve replaced. Patient  Doesn't feel he needs his valve replaced. Consult cards    4. Anemia/ likely 2/2 CKD chk iron stores in am. Continue his procrit. He receives outpt every two weeks? Defer to nephro. 5. MA: compensated on buffer. Continue. Follow. 6. LIBIA: stable periodic phos chks monitor. Continue active vitamin D.              Signed By: MILE Betancourt     February 12, 2017

## 2017-02-12 NOTE — PROGRESS NOTES
TRANSFER - IN REPORT:    Verbal report received from Alonzo Albarran, [address] [facility] on [employer]. Susan Hurt  being received from ED for routine progression of care      Report consisted of patients Situation, Background, Assessment and   Recommendations(SBAR). Information from the following report(s) ED Summary, MAR and Med Rec Status was reviewed with the receiving nurse. Opportunity for questions and clarification was provided. Assessment completed upon patients arrival to unit and care assumed.

## 2017-02-12 NOTE — H&P
HOSPITALIST H&P    NAME:  Enrike Walters   Age:  80 y.o.  :   1935   MRN:   [id number]  PCP: Yohannes Khalil MD  Chief complaint: syncope    Subjective:     Patient is a 80 y.o. male who presents with syncope. Pt got up from his recliner and started walking then rapidly became unconscious and falling to the ground. He was out approximately 10 minutes per wife. No chest pain, dyspnea, palpitations, nausea, vomiting, diarrhea, constipation, abdominal pain, or fevers. He has h/o aortic stenosis and CKD, but hasn't wanted surgical options due to fear of needing permanent dialysis afterward. Past Medical History   Diagnosis Date    Anemia in chronic kidney disease 2015    Arthritis     Chest pain on exertion 2016    Chronic kidney disease, stage IV (severe) (HCC) 2015    High cholesterol     Hypertrophy of prostate with urinary obstruction and other lower urinary tract symptoms (LUTS) 2015     Benign    Mixed hyperlipidemia 2015    Obesity (BMI 30-39.9) 2015       Past Surgical History   Procedure Laterality Date    Pr appendectomy      Hx knee arthroscopy      Hx colonoscopy  2010     Normal       No current facility-administered medications on file prior to encounter. Current Outpatient Prescriptions on File Prior to Encounter   Medication Sig Dispense Refill    simvastatin (ZOCOR) 20 mg tablet Take 1 Tab by mouth nightly. 90 Tab 1    terazosin (HYTRIN) 10 mg capsule Take 1 Cap by mouth nightly. 90 Cap 4    calcitRIOL (ROCALTROL) 0.25 mcg capsule   11    hydrocortisone (ANUSOL-HC) 25 mg suppository Insert 1 Suppository into rectum two (2) times daily as needed for Hemorrhoids. 12 Suppository 2    sodium bicarbonate 650 mg tablet Take 650 mg by mouth two (2) times a day.  6 tabs per day          Allergies   Allergen Reactions    Other Medication Unknown (comments)     DURATUSS       Social History   Substance Use Topics    Smoking status: Former Smoker    Smokeless tobacco: Never Used    Alcohol use No       Family History   Problem Relation Age of Onset    Cancer Father      Colon    Hypertension Mother        I have personally reviewed and reconciled patients history. Review of Systems  A comprehensive 12 point review of systems is negative other than what is listed above. Objective:     Patient Vitals for the past 24 hrs:   BP Temp Pulse Resp SpO2 Height Weight   02/11/17 2356 105/55 98 °F (36.7 °C) 74 16 96 % 5' 10\" (1.778 m) 95.7 kg (211 lb)       Exam:  General: awake, alert, no apparent distress  Eyes: anicteric  Neck: Supple, trachea midline  Lungs: Clear to auscultation bilaterally. No rales, wheezes, or rhonchi. Heart: Regular rate and rhythm.  + systolic murmur. Abdomen: Soft, nontender, nondistended. Bowel sounds normal.  No rebound tenderness, guarding, or rigidity. Extremities:  No LE edema. Skin: Warm/dry. No rashes or lesions. Neurologic: CN II-XII grossly intact bilaterally. Psych: AOx3. Normal mood and affect. Data Review (Labs):   Recent Results (from the past 24 hour(s))   CBC WITH AUTOMATED DIFF    Collection Time: 02/12/17 12:48 AM   Result Value Ref Range    WBC 5.3 4.3 - 11.1 K/uL    RBC 2.77 (L) 4.23 - 5.67 M/uL    HGB 7.9 (L) 13.6 - 17.2 g/dL    HCT 25.5 (L) 41.1 - 50.3 %    MCV 92.1 79.6 - 97.8 FL    MCH 28.5 26.1 - 32.9 PG    MCHC 31.0 (L) 31.4 - 35.0 g/dL    RDW 16.5 (H) 11.9 - 14.6 %    PLATELET 80 (L) 564 - 450 K/uL    MPV 12.8 10.8 - 14.1 FL    DF AUTOMATED      NEUTROPHILS 79 (H) 43 - 78 %    LYMPHOCYTES 7 (L) 13 - 44 %    MONOCYTES 8 4.0 - 12.0 %    EOSINOPHILS 2 0.5 - 7.8 %    BASOPHILS 3 (H) 0.0 - 2.0 %    IMMATURE GRANULOCYTES 1.5 0.0 - 5.0 %    ABS. NEUTROPHILS 4.2 1.7 - 8.2 K/UL    ABS. LYMPHOCYTES 0.4 (L) 0.5 - 4.6 K/UL    ABS. MONOCYTES 0.4 0.1 - 1.3 K/UL    ABS. EOSINOPHILS 0.1 0.0 - 0.8 K/UL    ABS. BASOPHILS 0.2 0.0 - 0.2 K/UL    ABS. IMM.  GRANS. 0.1 0.0 - 0.5 K/UL   METABOLIC PANEL, COMPREHENSIVE    Collection Time: 02/12/17 12:48 AM   Result Value Ref Range    Sodium 143 136 - 145 mmol/L    Potassium 3.7 3.5 - 5.1 mmol/L    Chloride 108 (H) 98 - 107 mmol/L    CO2 23 21 - 32 mmol/L    Anion gap 12 7 - 16 mmol/L    Glucose 172 (H) 65 - 100 mg/dL    BUN 56 (H) 8 - 23 MG/DL    Creatinine 5.08 (H) 0.8 - 1.5 MG/DL    GFR est AA 14 (L) >60 ml/min/1.73m2    GFR est non-AA 12 (L) >60 ml/min/1.73m2    Calcium 7.3 (L) 8.3 - 10.4 MG/DL    Bilirubin, total 0.2 0.2 - 1.1 MG/DL    ALT (SGPT) 35 12 - 65 U/L    AST (SGOT) 28 15 - 37 U/L    Alk.  phosphatase 113 50 - 136 U/L    Protein, total 6.1 (L) 6.3 - 8.2 g/dL    Albumin 3.2 3.2 - 4.6 g/dL    Globulin 2.9 2.3 - 3.5 g/dL    A-G Ratio 1.1 (L) 1.2 - 3.5     BNP    Collection Time: 02/12/17 12:48 AM   Result Value Ref Range    BNP <2 pg/mL   POC TROPONIN-I    Collection Time: 02/12/17 12:49 AM   Result Value Ref Range    Troponin-I (POC) 0.06 0.0 - 0.08 ng/ml       Assessment:   Principal Problem:    Syncope (2/12/2017)    Active Problems:    Anemia in chronic kidney disease (7/13/2015)      Chronic kidney disease, stage IV (severe) (HCC) (7/13/2015)      Nonrheumatic aortic valve stenosis (1/6/2017)      JUSTIN (acute kidney injury) ([facility] [address] (2/12/2017)        Plan:     Admit to telemetry  Trend troponin  Orthostatics  Echo on 12/22/16, no repeat at this time  Consult Cardiology  IVF    DVT prophylaxis: sq heparin  Code Status: DNR    Plan of care discussed with: patient/wife  Time spent on patient care: 30 minutes  Anticipated date of discharge:  3 days    Nando Elam DO

## 2017-02-12 NOTE — ED NOTES
Report received from Jonna Robertson, [address] [facility]. IVF's started as ordered, 2nd Trop drawn and POC in process. Blankets and pillow given. Pt and spouse updated on bed status. Water given. Pt sts does not want food at this time.

## 2017-02-13 VITALS
OXYGEN SATURATION: 98 % | SYSTOLIC BLOOD PRESSURE: 116 MMHG | HEART RATE: 63 BPM | TEMPERATURE: 98 F | DIASTOLIC BLOOD PRESSURE: 52 MMHG | WEIGHT: 211 LBS | RESPIRATION RATE: 18 BRPM | HEIGHT: 70 IN | BODY MASS INDEX: 30.21 KG/M2

## 2017-02-13 PROBLEM — D69.6 THROMBOCYTOPENIA (HCC): Status: ACTIVE | Noted: 2017-02-13

## 2017-02-13 PROBLEM — I95.1 ORTHOSTATIC HYPOTENSION: Status: ACTIVE | Noted: 2017-02-13

## 2017-02-13 RX ORDER — SODIUM BICARBONATE 650 MG/1
650 TABLET ORAL 2 TIMES DAILY
Status: CANCELLED | OUTPATIENT
Start: 2017-02-13

## 2017-02-13 RX ORDER — SIMVASTATIN 20 MG/1
20 TABLET, FILM COATED ORAL
Status: CANCELLED | OUTPATIENT
Start: 2017-02-13

## 2017-02-13 RX ORDER — FINASTERIDE 5 MG/1
5 TABLET, FILM COATED ORAL DAILY
Status: CANCELLED | OUTPATIENT
Start: 2017-02-13

## 2017-02-13 RX ADMIN — Medication 10 ML: at 06:09

## 2017-02-13 NOTE — DISCHARGE SUMMARY
Physician Discharge Summary       Patient: Diane Stearns MRN: [id number]  SSN: [id number]    YOB: 1935  Age: 80 y.o. Sex: male    PCP: David Canada MD    Admit date: 2/11/2017  Admitting Provider: Nando Elam DO    Discharge date: 2/13/2017  Discharging Provider: Na Feliz MD    * Admission Diagnoses: Syncope    * Discharge Diagnoses:    Hospital Problems as of 2/13/2017  Date Reviewed: 1/6/2017          Codes Class Noted - Resolved POA    Thrombocytopenia ([facility] ICD-10-CM: D69.6  ICD-9-CM: 287.5  2/13/2017 - Present Yes        Orthostatic hypotension ICD-10-CM: I95.1  ICD-9-CM: 458.0  2/13/2017 - Present Yes        * (Principal)Syncope ICD-10-CM: R55  ICD-9-CM: 780.2  2/12/2017 - Present Yes        JUSTIN (acute kidney injury) ([facility] ICD-10-CM: N17.9  ICD-9-CM: 584.9  2/12/2017 - Present Yes        Nonrheumatic aortic valve stenosis ICD-10-CM: I35.0  ICD-9-CM: 424.1  1/6/2017 - Present Yes        Anemia in chronic kidney disease ICD-10-CM: N18.9, D63.1  ICD-9-CM: 285.21, 585.9  7/13/2015 - Present Yes        Chronic kidney disease, stage IV (severe) ([facility] ICD-10-CM: N18.4  ICD-9-CM: 585.4  7/13/2015 - Present Yes              * Hospital Course:     Mr. Shahana Stearns is an 80year old white male, with a pmh of CKD stage 4/5, who presented 2/12 for an unwitnessed syncopal event at home, although his wife heard him hit the ground. It took him several minutes to come to. Patient did not have chest pain nor sob prior to event but did have dizziness and weakness prior to falling. He has had three syncopal events in the past month. He has also been experiencing chest pain with exertion recently. This prompted his PCP to refer him to [facility] who performed a stress test, negative for blockages per patient. Echo obtained about a month ago showing an ef of 55-60% and severe aortic stenosis.   Patient refused surgery on his aortic valve as he is adamant he does not want to start dialysis and doesn't want any intervention that would increase his risk of advancing to ESRD. Cardiology consulted this admission but patient and his wife were not happy with [facility] and do not want to see that group. They want to be discharged home with follow up at [facility]. Findings here have shown evidence of orthostatic hypotension probably due to his severe aortic stenosis. He and his wife have been instructed on taking great care with changing positions as he was 129/61 with lying and 111/48 on standing. Renal function at baseline. He is now being discharged home with close cardiology and nephrology follow up. Consults:  Nephrology    Discharge Exam:    General: awake, alert, oriented, cooperative, no acute distress  Eyes; non icteric, EOMI  Neck; supple  Cv: RRR, 4/6 KRISTINA  Pulm; CTAB  Abd; soft, non tender, non distended, active BS    * Discharge Condition: guarded  * Disposition: home    Discharge Medications:  Current Discharge Medication List      CONTINUE these medications which have NOT CHANGED    Details   finasteride (PROSCAR) 5 mg tablet Take 5 mg by mouth daily. simvastatin (ZOCOR) 20 mg tablet Take 1 Tab by mouth nightly. Qty: 90 Tab, Refills: 1      terazosin (HYTRIN) 10 mg capsule Take 1 Cap by mouth nightly. Qty: 90 Cap, Refills: 4    Associated Diagnoses: Benign prostatic hypertrophy (BPH) with nocturia      calcitRIOL (ROCALTROL) 0.25 mcg capsule Refills: 11      sodium bicarbonate 650 mg tablet Take 650 mg by mouth two (2) times a day. 6 tabs per day       hydrocortisone (ANUSOL-HC) 25 mg suppository Insert 1 Suppository into rectum two (2) times daily as needed for Hemorrhoids. Qty: 12 Suppository, Refills: 2             * Follow-up Care/Patient Instructions: Activity: as tolerated.  Is to take great care in changing positions from lying to sitting and sitting to standing  Diet: cardiac    Follow-up Information     Follow up With Details Comments Contact [address]  Cardiology Schedule an appointment as soon as possible for a visit in 3 days to establish care for severe Aortic Stenosis in patient with multiple syncopal episodes over past month     Louisa Farias MD Schedule an appointment as soon as possible for a visit in 1 week  [address]  [address] [facility] 2  [phone]          35 minutes spent in discharge planning and coordination of care.      Signed:  Bella Whaley MD  2/13/2017  9:49 AM

## 2017-02-13 NOTE — PROGRESS NOTES
Bedside and Verbal shift change report given to [facility] by Sherita Aquino RN. Report included the following information SBAR, Intake/Output, MAR, Recent Results, Med Rec Status and Cardiac Rhythm SR with PVCs. Kortney Torres

## 2017-02-13 NOTE — PROGRESS NOTES
DISCHARGE SUMMARY from Nurse    The following personal items are in your possession at time of discharge:    Dental Appliances: None  Visual Aid: Glasses, With patient     Home Medications: None  Jewelry: None  Clothing: None  Other Valuables: Other (comment) ([place])             PATIENT INSTRUCTIONS:    After general anesthesia or intravenous sedation, for 24 hours or while taking prescription Narcotics:  · Limit your activities  · Do not drive and operate hazardous machinery  · Do not make important personal or business decisions  · Do  not drink alcoholic beverages  · If you have not urinated within 8 hours after discharge, please contact your surgeon on call. Report the following to your surgeon:  · Excessive pain, swelling, redness or odor of or around the surgical area  · Temperature over 100.5  · Nausea and vomiting lasting longer than 4 hours or if unable to take medications  · Any signs of decreased circulation or nerve impairment to extremity: change in color, persistent  numbness, tingling, coldness or increase pain  · Any questions        What to do at Home:  Recommended activity: No lifting, Driving, or Strenuous exercise until following up with [facility]. If you experience any of the following symptoms light-headedness, fainting. .. please follow up with ED. *  Please give a list of your current medications to your Primary Care Provider. *  Please update this list whenever your medications are discontinued, doses are      changed, or new medications (including over-the-counter products) are added. *  Please carry medication information at all times in case of emergency situations. These are general instructions for a healthy lifestyle:    No smoking/ No tobacco products/ Avoid exposure to second hand smoke    Surgeon General's Warning:  Quitting smoking now greatly reduces serious risk to your health.     Obesity, smoking, and sedentary lifestyle greatly increases your risk for illness    A healthy diet, regular physical exercise & weight monitoring are important for maintaining a healthy lifestyle    You may be retaining fluid if you have a history of heart failure or if you experience any of the following symptoms:  Weight gain of 3 pounds or more overnight or 5 pounds in a week, increased swelling in our hands or feet or shortness of breath while lying flat in bed. Please call your doctor as soon as you notice any of these symptoms; do not wait until your next office visit. Recognize signs and symptoms of STROKE:    F-face looks uneven    A-arms unable to move or move unevenly    S-speech slurred or non-existent    T-time-call 911 as soon as signs and symptoms begin-DO NOT go       Back to bed or wait to see if you get better-TIME IS BRAIN. Warning Signs of HEART ATTACK     Call 911 if you have these symptoms:   Chest discomfort. Most heart attacks involve discomfort in the center of the chest that lasts more than a few minutes, or that goes away and comes back. It can feel like uncomfortable pressure, squeezing, fullness, or pain.  Discomfort in other areas of the upper body. Symptoms can include pain or discomfort in one or both arms, the back, neck, jaw, or stomach.  Shortness of breath with or without chest discomfort.  Other signs may include breaking out in a cold sweat, nausea, or lightheadedness. Don't wait more than five minutes to call [address]! Fast action can save your life. Calling 911 is almost always the fastest way to get lifesaving treatment. Emergency Medical Services staff can begin treatment when they arrive  up to an hour sooner than if someone gets to the hospital by car. The discharge information has been reviewed with the patient and spouse. The patient and spouse verbalized understanding.     Discharge medications reviewed with the patient and spouse and appropriate educational materials and side effects teaching were provided.

## 2017-02-13 NOTE — DISCHARGE INSTRUCTIONS
DISCHARGE SUMMARY from Nurse    The following personal items are in your possession at time of discharge:    Dental Appliances: None  Visual Aid: Glasses, With patient     Home Medications: None  Jewelry: None  Clothing: None  Other Valuables: Other (comment) ([place])             PATIENT INSTRUCTIONS:    After general anesthesia or intravenous sedation, for 24 hours or while taking prescription Narcotics:  · Limit your activities  · Do not drive and operate hazardous machinery  · Do not make important personal or business decisions  · Do  not drink alcoholic beverages  · If you have not urinated within 8 hours after discharge, please contact your surgeon on call. Report the following to your surgeon:  · Excessive pain, swelling, redness or odor of or around the surgical area  · Temperature over 100.5  · Nausea and vomiting lasting longer than 4 hours or if unable to take medications  · Any signs of decreased circulation or nerve impairment to extremity: change in color, persistent  numbness, tingling, coldness or increase pain  · Any questions        What to do at Home:  Recommended activity: {discharge activity:92874}, ***    If you experience any of the following symptoms ***, please follow up with ***. *  Please give a list of your current medications to your Primary Care Provider. *  Please update this list whenever your medications are discontinued, doses are      changed, or new medications (including over-the-counter products) are added. *  Please carry medication information at all times in case of emergency situations. These are general instructions for a healthy lifestyle:    No smoking/ No tobacco products/ Avoid exposure to second hand smoke    Surgeon General's Warning:  Quitting smoking now greatly reduces serious risk to your health.     Obesity, smoking, and sedentary lifestyle greatly increases your risk for illness    A healthy diet, regular physical exercise & weight monitoring are important for maintaining a healthy lifestyle    You may be retaining fluid if you have a history of heart failure or if you experience any of the following symptoms:  Weight gain of 3 pounds or more overnight or 5 pounds in a week, increased swelling in our hands or feet or shortness of breath while lying flat in bed. Please call your doctor as soon as you notice any of these symptoms; do not wait until your next office visit. Recognize signs and symptoms of STROKE:    F-face looks uneven    A-arms unable to move or move unevenly    S-speech slurred or non-existent    T-time-call 911 as soon as signs and symptoms begin-DO NOT go       Back to bed or wait to see if you get better-TIME IS BRAIN. Warning Signs of HEART ATTACK     Call 911 if you have these symptoms:   Chest discomfort. Most heart attacks involve discomfort in the center of the chest that lasts more than a few minutes, or that goes away and comes back. It can feel like uncomfortable pressure, squeezing, fullness, or pain.  Discomfort in other areas of the upper body. Symptoms can include pain or discomfort in one or both arms, the back, neck, jaw, or stomach.  Shortness of breath with or without chest discomfort.  Other signs may include breaking out in a cold sweat, nausea, or lightheadedness. Don't wait more than five minutes to call 911 - MINUTES MATTER! Fast action can save your life. Calling 911 is almost always the fastest way to get lifesaving treatment. Emergency Medical Services staff can begin treatment when they arrive -- up to an hour sooner than if someone gets to the hospital by car. The discharge information has been reviewed with the {PATIENT PARENT GUARDIAN:09262}. The {PATIENT PARENT GUARDIAN:35997} verbalized understanding. Discharge medications reviewed with the {Dishcarge meds reviewed EBYD:48819} and appropriate educational materials and side effects teaching were provided.

## 2017-02-14 ENCOUNTER — PATIENT OUTREACH (OUTPATIENT)
Dept: CASE MANAGEMENT | Age: 82
End: 2017-02-14

## 2017-02-14 NOTE — PROGRESS NOTES
This note will not be viewable in [address] Transition of Care Discharge Follow-up Questionnaire     Date/Time of Call:      2/14/2017   11:48 am     What was the patient hospitalized for? Patient was hospitalized for diagnosis of:  Syncope       Does the patient understand his/her diagnosis and/or treatment and what happened during the hospitalization? Patients spouse voiced understanding of diagnosis and /or treatment. Did the patient receive discharge instructions? Yes. Patients spouse states discharge instructions explained and received before discharge to home. Review any discharge instructions (see notes in [facility]). Ask patient if they understand these. Do they have any questions? Care Coordinator and patients spouse reviewed discharge instructions per [facility]. Opportunity for questions and clarification provided. Patients spouse verbalized understanding of instructions. Were home services ordered (nursing, PT, OT, ST, etc.)? No home services were ordered. If so, has the first visit occurred? If not, why? (Assist with coordination of services if necessary.)   n/a     Was any DME ordered? No DME ordered. If so, has it been received? If not, why?  (Assist with coordination of arranging DME orders if necessary.)   n/a     Complete a review of all medications (new, continued and discontinued meds per the D/C instructions and medication tab in [facility]). Care Coordinator and patients spouse reviewed medications per [facility]. Were all new prescriptions filled? If not, why?  (Assist with obtainment of medications if necessary.)   No new medications were prescribed by ED Care Provider. Does the patient understand the purpose and dosing instructions for all medications?   (If patient has questions, provide explanation and education.)     Patients spouse voiced understanding of purpose and dosing instructions for all medications. Does the patient have any problems in performing ADLs? (If patient is unable to perform ADLs  what is the limiting factor(s)? Do they have a support system that can assist? If no support system is present, discuss possible assistance that they may be able to obtain.)     Patients spouse reports patient being independent and able to perform ADLs without assistance. Patients spouse is available to help if/when needed. Does the patient have all follow-up appointments scheduled? Has transportation been arranged? [facility] follow-up should be within 7 days of discharge; all others should have PCP follow-up within 7 days of discharge; follow-ups with other specialists as appropriate or ordered.)     Patients spouse encouraged and voiced agreement to schedule ED follow up with Dr. Ashley Brandt within 7 days. Patients spouse reports patient being scheduled for Cardiology consult, today 2/14/2017 @ 3pm    Patient has transportation available. Any other questions or concerns expressed by the patient? Patients spouse voiced no other questions or concerns and was appreciative of call. No other needs identified.            JOSELIN Call Completed By:   Riccardo Cazares, [address] Coordinator

## 2017-06-27 ENCOUNTER — HOSPITAL ENCOUNTER (OUTPATIENT)
Dept: GENERAL RADIOLOGY | Age: 82
Discharge: HOME | End: 2017-06-27
Attending: NURSE PRACTITIONER
Payer: MEDICARE

## 2017-06-27 DIAGNOSIS — M25.475 SWELLING OF FOOT JOINT, LEFT: ICD-10-CM

## 2017-06-27 DIAGNOSIS — M79.672 LEFT FOOT PAIN: ICD-10-CM

## 2017-06-27 PROCEDURE — 73630 X-RAY EXAM OF FOOT: CPT

## 2017-12-08 PROBLEM — R55 SYNCOPE: Status: RESOLVED | Noted: 2017-02-12 | Resolved: 2017-12-08

## 2017-12-08 PROBLEM — I95.1 ORTHOSTATIC HYPOTENSION: Status: RESOLVED | Noted: 2017-02-13 | Resolved: 2017-12-08
